# Patient Record
Sex: FEMALE | Race: BLACK OR AFRICAN AMERICAN | URBAN - METROPOLITAN AREA
[De-identification: names, ages, dates, MRNs, and addresses within clinical notes are randomized per-mention and may not be internally consistent; named-entity substitution may affect disease eponyms.]

---

## 2017-04-09 ENCOUNTER — HOSPITAL ENCOUNTER (EMERGENCY)
Age: 37
Discharge: HOME | End: 2017-04-09
Attending: EMERGENCY MEDICINE
Payer: MEDICARE

## 2017-04-09 ENCOUNTER — APPOINTMENT (OUTPATIENT)
Dept: GENERAL RADIOLOGY | Age: 37
End: 2017-04-09
Payer: MEDICARE

## 2017-04-09 VITALS
BODY MASS INDEX: 32.62 KG/M2 | RESPIRATION RATE: 18 BRPM | TEMPERATURE: 98.4 F | HEART RATE: 69 BPM | HEIGHT: 66 IN | WEIGHT: 203 LBS | DIASTOLIC BLOOD PRESSURE: 71 MMHG | SYSTOLIC BLOOD PRESSURE: 113 MMHG | OXYGEN SATURATION: 100 %

## 2017-04-09 DIAGNOSIS — K29.00 ACUTE GASTRITIS WITHOUT HEMORRHAGE, UNSPECIFIED GASTRITIS TYPE: Primary | ICD-10-CM

## 2017-04-09 LAB
POC TROPONIN I: 0 NG/ML (ref 0–0.1)
POC TROPONIN INTERP: NORMAL

## 2017-04-09 PROCEDURE — 84484 ASSAY OF TROPONIN QUANT: CPT

## 2017-04-09 PROCEDURE — 93005 ELECTROCARDIOGRAM TRACING: CPT

## 2017-04-09 PROCEDURE — 99285 EMERGENCY DEPT VISIT HI MDM: CPT

## 2017-04-09 PROCEDURE — 71020 XR CHEST STANDARD TWO VW: CPT

## 2017-04-09 RX ORDER — PANTOPRAZOLE SODIUM 20 MG/1
20 TABLET, DELAYED RELEASE ORAL DAILY
Qty: 30 TABLET | Refills: 0 | Status: SHIPPED | OUTPATIENT
Start: 2017-04-09

## 2017-04-09 ASSESSMENT — ENCOUNTER SYMPTOMS
NAUSEA: 0
ABDOMINAL PAIN: 0
VOMITING: 0
COUGH: 0
CONSTIPATION: 0
SHORTNESS OF BREATH: 0
CHEST TIGHTNESS: 0
DIARRHEA: 0

## 2017-04-09 ASSESSMENT — PAIN DESCRIPTION - ORIENTATION: ORIENTATION: LEFT

## 2017-04-09 ASSESSMENT — PAIN DESCRIPTION - FREQUENCY: FREQUENCY: CONTINUOUS

## 2017-04-09 ASSESSMENT — PAIN SCALES - GENERAL: PAINLEVEL_OUTOF10: 8

## 2017-04-09 ASSESSMENT — PAIN DESCRIPTION - PAIN TYPE: TYPE: ACUTE PAIN

## 2017-04-09 ASSESSMENT — PAIN DESCRIPTION - LOCATION: LOCATION: CHEST

## 2017-04-09 ASSESSMENT — PAIN DESCRIPTION - DESCRIPTORS: DESCRIPTORS: THROBBING

## 2017-04-13 LAB
EKG ATRIAL RATE: 78 BPM
EKG P AXIS: 53 DEGREES
EKG P-R INTERVAL: 154 MS
EKG Q-T INTERVAL: 386 MS
EKG QRS DURATION: 88 MS
EKG QTC CALCULATION (BAZETT): 440 MS
EKG R AXIS: 62 DEGREES
EKG T AXIS: 29 DEGREES
EKG VENTRICULAR RATE: 78 BPM

## 2017-04-20 ENCOUNTER — HOSPITAL ENCOUNTER (OUTPATIENT)
Age: 37
Setting detail: SPECIMEN
Discharge: HOME | End: 2017-04-20
Payer: MEDICARE

## 2017-04-20 LAB
DIRECT EXAM: ABNORMAL
Lab: ABNORMAL
SPECIMEN DESCRIPTION: ABNORMAL
STATUS: ABNORMAL

## 2017-08-07 ENCOUNTER — HOSPITAL ENCOUNTER (OUTPATIENT)
Age: 37
Setting detail: SPECIMEN
Discharge: HOME | End: 2017-08-07
Payer: MEDICARE

## 2017-08-08 LAB
C TRACH DNA GENITAL QL NAA+PROBE: NEGATIVE
DIRECT EXAM: ABNORMAL
Lab: ABNORMAL
N. GONORRHOEAE DNA: NEGATIVE
SPECIMEN DESCRIPTION: ABNORMAL
STATUS: ABNORMAL

## 2017-08-09 LAB
HPV SAMPLE: NORMAL
HPV SOURCE: NORMAL
HPV, GENOTYPE 16: NOT DETECTED
HPV, GENOTYPE 18: NOT DETECTED
HPV, HIGH RISK OTHER: NOT DETECTED
HPV, INTERPRETATION: NORMAL

## 2017-08-10 LAB — CYTOLOGY REPORT: NORMAL

## 2019-03-13 ENCOUNTER — HOSPITAL ENCOUNTER (OUTPATIENT)
Age: 39
Setting detail: SPECIMEN
Discharge: HOME | End: 2019-03-13
Payer: MEDICARE

## 2019-03-13 LAB
DIRECT EXAM: ABNORMAL
Lab: ABNORMAL
SPECIMEN DESCRIPTION: ABNORMAL

## 2019-03-14 LAB
C TRACH DNA GENITAL QL NAA+PROBE: NEGATIVE
N. GONORRHOEAE DNA: NEGATIVE
SPECIMEN DESCRIPTION: NORMAL

## 2020-04-24 ENCOUNTER — APPOINTMENT (OUTPATIENT)
Dept: GENERAL RADIOLOGY | Age: 40
End: 2020-04-24
Payer: MEDICARE

## 2020-04-24 ENCOUNTER — HOSPITAL ENCOUNTER (EMERGENCY)
Age: 40
Discharge: HOME | End: 2020-04-24
Attending: EMERGENCY MEDICINE
Payer: MEDICARE

## 2020-04-24 VITALS
HEART RATE: 79 BPM | OXYGEN SATURATION: 97 % | RESPIRATION RATE: 18 BRPM | SYSTOLIC BLOOD PRESSURE: 119 MMHG | TEMPERATURE: 98.5 F | DIASTOLIC BLOOD PRESSURE: 72 MMHG

## 2020-04-24 LAB
CHP ED QC CHECK: NORMAL
PREGNANCY TEST URINE, POC: NEGATIVE

## 2020-04-24 PROCEDURE — 6370000000 HC RX 637 (ALT 250 FOR IP): Performed by: STUDENT IN AN ORGANIZED HEALTH CARE EDUCATION/TRAINING PROGRAM

## 2020-04-24 PROCEDURE — 71046 X-RAY EXAM CHEST 2 VIEWS: CPT

## 2020-04-24 PROCEDURE — 99284 EMERGENCY DEPT VISIT MOD MDM: CPT

## 2020-04-24 PROCEDURE — 73502 X-RAY EXAM HIP UNI 2-3 VIEWS: CPT

## 2020-04-24 RX ORDER — ACETAMINOPHEN 500 MG
1000 TABLET ORAL ONCE
Status: COMPLETED | OUTPATIENT
Start: 2020-04-24 | End: 2020-04-24

## 2020-04-24 RX ORDER — ACETAMINOPHEN 325 MG/1
650 TABLET ORAL EVERY 6 HOURS PRN
Qty: 30 TABLET | Refills: 0 | Status: SHIPPED | OUTPATIENT
Start: 2020-04-24

## 2020-04-24 RX ORDER — CYCLOBENZAPRINE HCL 5 MG
5 TABLET ORAL 2 TIMES DAILY PRN
Qty: 10 TABLET | Refills: 0 | Status: SHIPPED | OUTPATIENT
Start: 2020-04-24 | End: 2020-05-04

## 2020-04-24 RX ORDER — CYCLOBENZAPRINE HCL 10 MG
5 TABLET ORAL ONCE
Status: COMPLETED | OUTPATIENT
Start: 2020-04-24 | End: 2020-04-24

## 2020-04-24 RX ADMIN — CYCLOBENZAPRINE 5 MG: 10 TABLET, FILM COATED ORAL at 16:18

## 2020-04-24 RX ADMIN — ACETAMINOPHEN 1000 MG: 500 TABLET ORAL at 16:18

## 2020-04-24 ASSESSMENT — ENCOUNTER SYMPTOMS
BACK PAIN: 1
NAUSEA: 0
SHORTNESS OF BREATH: 0
VOMITING: 0
COUGH: 0
ABDOMINAL PAIN: 0
FACIAL SWELLING: 0

## 2020-04-24 ASSESSMENT — PAIN SCALES - GENERAL
PAINLEVEL_OUTOF10: 8
PAINLEVEL_OUTOF10: 8

## 2020-04-24 ASSESSMENT — PAIN DESCRIPTION - LOCATION: LOCATION: BACK;NECK

## 2020-04-24 ASSESSMENT — PAIN DESCRIPTION - DESCRIPTORS: DESCRIPTORS: ACHING;DISCOMFORT
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
## 2022-12-10 ENCOUNTER — HOSPITAL ENCOUNTER (EMERGENCY)
Age: 42
Discharge: HOME | End: 2022-12-10
Attending: EMERGENCY MEDICINE
Payer: MEDICARE

## 2022-12-10 ENCOUNTER — APPOINTMENT (OUTPATIENT)
Dept: GENERAL RADIOLOGY | Age: 42
End: 2022-12-10
Payer: MEDICARE

## 2022-12-10 VITALS
TEMPERATURE: 101.1 F | RESPIRATION RATE: 16 BRPM | HEART RATE: 113 BPM | BODY MASS INDEX: 31.82 KG/M2 | SYSTOLIC BLOOD PRESSURE: 109 MMHG | OXYGEN SATURATION: 99 % | HEIGHT: 66 IN | WEIGHT: 198 LBS | DIASTOLIC BLOOD PRESSURE: 69 MMHG

## 2022-12-10 DIAGNOSIS — B34.9 VIRAL ILLNESS: Primary | ICD-10-CM

## 2022-12-10 LAB
FLU A ANTIGEN: NEGATIVE
FLU B ANTIGEN: NEGATIVE
SARS-COV-2, RAPID: NOT DETECTED
SPECIMEN DESCRIPTION: NORMAL

## 2022-12-10 PROCEDURE — 71045 X-RAY EXAM CHEST 1 VIEW: CPT

## 2022-12-10 PROCEDURE — 6370000000 HC RX 637 (ALT 250 FOR IP): Performed by: EMERGENCY MEDICINE

## 2022-12-10 PROCEDURE — 87635 SARS-COV-2 COVID-19 AMP PRB: CPT

## 2022-12-10 PROCEDURE — 93005 ELECTROCARDIOGRAM TRACING: CPT | Performed by: EMERGENCY MEDICINE

## 2022-12-10 PROCEDURE — 87804 INFLUENZA ASSAY W/OPTIC: CPT

## 2022-12-10 PROCEDURE — 99285 EMERGENCY DEPT VISIT HI MDM: CPT

## 2022-12-10 RX ORDER — ONDANSETRON 4 MG/1
4 TABLET, ORALLY DISINTEGRATING ORAL ONCE
Status: COMPLETED | OUTPATIENT
Start: 2022-12-10 | End: 2022-12-10

## 2022-12-10 RX ORDER — IBUPROFEN 800 MG/1
800 TABLET ORAL ONCE
Status: COMPLETED | OUTPATIENT
Start: 2022-12-10 | End: 2022-12-10

## 2022-12-10 RX ORDER — TRAMADOL HYDROCHLORIDE 50 MG/1
50 TABLET ORAL ONCE
Status: COMPLETED | OUTPATIENT
Start: 2022-12-10 | End: 2022-12-10

## 2022-12-10 RX ADMIN — TRAMADOL HYDROCHLORIDE 50 MG: 50 TABLET, COATED ORAL at 20:09

## 2022-12-10 RX ADMIN — ONDANSETRON 4 MG: 4 TABLET, ORALLY DISINTEGRATING ORAL at 20:09

## 2022-12-10 RX ADMIN — IBUPROFEN 800 MG: 800 TABLET, FILM COATED ORAL at 20:09

## 2022-12-10 ASSESSMENT — ENCOUNTER SYMPTOMS
CHEST TIGHTNESS: 0
SINUS PAIN: 0
DIARRHEA: 0
CONSTIPATION: 0
SHORTNESS OF BREATH: 0
COLOR CHANGE: 0
ABDOMINAL DISTENTION: 0
VOMITING: 0
APNEA: 0
BACK PAIN: 1
EYES NEGATIVE: 1

## 2022-12-10 ASSESSMENT — PAIN DESCRIPTION - DESCRIPTORS: DESCRIPTORS: ACHING

## 2022-12-10 ASSESSMENT — PAIN DESCRIPTION - LOCATION: LOCATION: GENERALIZED

## 2022-12-10 NOTE — Clinical Note
Azalea Guerrier was seen and treated in our emergency department on 12/10/2022. She may return to work on 12/16/2022. If you have any questions or concerns, please don't hesitate to call.       Andres Reddy MD

## 2022-12-11 NOTE — ED PROVIDER NOTES
EMERGENCY DEPARTMENT ENCOUNTER    Pt Name: Jorge Estrada  MRN: [id number]  [place] 1980  Date of evaluation: 12/10/22  CHIEF COMPLAINT       Chief Complaint   Patient presents with    Generalized Body Aches     Onset this morning    Fever     Low grade 100.6 F     HISTORY OF PRESENT ILLNESS   50-year-old female presenting to the emergency room for body aches headache cough and fever. Symptoms started today. Patient works in a nursing home. She is very sore. No injuries. Patient reports dry cough and feeling some shortness of breath. REVIEW OF SYSTEMS     Review of Systems   Constitutional:  Positive for activity change, appetite change, chills, fatigue and fever. Negative for diaphoresis. HENT:  Negative for congestion, sinus pain and tinnitus. Eyes: Negative. Respiratory:  Negative for apnea, chest tightness and shortness of breath. Gastrointestinal:  Negative for abdominal distention, constipation, diarrhea and vomiting. Genitourinary:  Negative for difficulty urinating and frequency. Musculoskeletal:  Positive for arthralgias, back pain and myalgias. Skin:  Negative for color change and rash. Neurological:  Positive for headaches. Negative for dizziness. Hematological: Negative. Psychiatric/Behavioral: Negative. PASTMEDICAL HISTORY   No past medical history on file. Past Problem List  Patient Active Problem List   Diagnosis Code    HRP (high risk pregnancy) O09.90    Pityriasis rosea-like skin eruption R21    Back pain complicating pregnancy C24.520, M54.9     SURGICAL HISTORY     No past surgical history on file.   CURRENT MEDICATIONS       Current Discharge Medication List        CONTINUE these medications which have NOT CHANGED    Details   acetaminophen (TYLENOL) 325 MG tablet Take 2 tablets by mouth every 6 hours as needed for Pain  Qty: 30 tablet, Refills: 0      pantoprazole (PROTONIX) 20 MG tablet Take 1 tablet by mouth daily  Qty: 30 tablet, Refills: 0 ibuprofen (ADVIL;MOTRIN) 800 MG tablet Take 1 tablet by mouth every 8 hours as needed for Pain or Fever  Qty: 15 tablet, Refills: 0      HYDROcodone-acetaminophen (NORCO) 5-325 MG per tablet Take 1 tablet by mouth every 6 hours as needed for Pain (WARNING:  May cause drowsiness. May impair ability to operate vehicles or machinery. Do not use in combination with alcohol.)  Qty: 20 tablet, Refills: 0      hydrOXYzine (ATARAX) 50 MG tablet Take 1 tablet by mouth every 6 hours as needed for Itching. Qty: 30 tablet, Refills: 0      HYDROCORTISONE, TOPICAL, 2 % LOTN Apply 1 Tube topically 2 times daily as needed. Qty: 1 Bottle, Refills: 0           ALLERGIES     is allergic to morphine. FAMILY HISTORY     has no family status information on file. SOCIAL HISTORY       Social History     Tobacco Use    Smoking status: Every Day     Packs/day: 0.25     Types: Cigarettes   Substance Use Topics    Alcohol use: No     Comment: social    Drug use: Yes     Types: Marijuana (Weed)     PHYSICAL EXAM     INITIAL VITALS: /69   Pulse (!) 113   Temp (!) 101.1 °F (38.4 °C) (Oral)   Resp 16   Ht 5' 6\" (1.676 m)   Wt 198 lb (89.8 kg)   LMP 12/01/2022   SpO2 99%   BMI 31.96 kg/m²    Physical Exam  Constitutional:       General: She is not in acute distress. Appearance: She is well-developed. She is ill-appearing. HENT:      Head: Normocephalic. Eyes:      Pupils: Pupils are equal, round, and reactive to light. Cardiovascular:      Rate and Rhythm: Normal rate and regular rhythm. Heart sounds: Normal heart sounds. Pulmonary:      Effort: Pulmonary effort is normal. No respiratory distress. Breath sounds: Normal breath sounds. Abdominal:      General: Bowel sounds are normal.      Palpations: Abdomen is soft. Tenderness: There is no abdominal tenderness. Musculoskeletal:         General: Normal range of motion. Skin:     General: Skin is warm and dry.    Neurological:      Mental Status: She is alert and oriented to person, place, and time. MEDICAL DECISION MAKIN-year-old female presenting to the emergency room for cough myalgias headache fever. Patient has clear lung sounds on exam.  SPO2 is 99% and respirations are normal.  Patient is mildly tachycardic however has fever. Symptoms are considered viral at this time. Chest x-ray is negative for acute intrathoracic process. Rapid COVID and flu were negative here in the ED. Based on the patient's clinical exam I feel she is stable for discharge. Patient given home care instructions and return precautions. CRITICAL CARE:       PROCEDURES:    Procedures    DIAGNOSTIC RESULTS   EKG:All EKG's are interpreted by the Emergency Department Physician who either signs or Co-signs this chart in the absence of a cardiologist.        RADIOLOGY:All plain film, CT, MRI, and formal ultrasound images (except ED bedside ultrasound) are read by the radiologist, see reports below, unless otherwisenoted in MDM or here. XR CHEST PORTABLE   Final Result   Mild perihilar congestion. Suspect mild bibasilar edema or bibasilar atelectasis           LABS: All lab results were reviewed by myself, and all abnormals are listed below. Labs Reviewed   RAPID INFLUENZA A/B ANTIGENS   COVID-19, RAPID       EMERGENCY DEPARTMENTCOURSE:         Vitals:    Vitals:    12/10/22 1805 12/10/22 2051   BP: 109/69    Pulse: (!) 113    Resp: 16    Temp: (!) 100.6 °F (38.1 °C) (!) 101.1 °F (38.4 °C)   TempSrc: Oral Oral   SpO2: 99%    Weight: 198 lb (89.8 kg)    Height: 5' 6\" (1.676 m)        The patient was given the following medications while in the emergency department:  Orders Placed This Encounter   Medications    ibuprofen (ADVIL;MOTRIN) tablet 800 mg    traMADol (ULTRAM) tablet 50 mg    ondansetron (ZOFRAN-ODT) disintegrating tablet 4 mg     CONSULTS:  None    FINAL IMPRESSION      1.  Viral illness          DISPOSITION/PLAN   DISPOSITION Decision To Discharge 12/10/2022 08:57:25 PM      PATIENT REFERRED TO:  No follow-up provider specified. DISCHARGE MEDICATIONS:  Current Discharge Medication List        Henrietta Patino MD  Attending Emergency Physician      Care during this encounter was due to an unprecedented national emergency due to COVID-19.              Nasreen Snow MD  12/10/22 3898

## 2022-12-11 NOTE — ED NOTES
This RN spoke with ER MD about increase in Temp.  No orders given at this time     Vianca Ortega RN  12/10/22 2054

## 2022-12-11 NOTE — DISCHARGE INSTRUCTIONS
Try to make sure you are getting plenty of fluids at home. I recommend Tylenol and Motrin to help with body aches headaches and fever. You can alternate these medications every 4 hours. It is common to be ill for at least 5 days with viral illnesses. Body aches sore throat cough headache fatigue nausea vomiting are all common symptoms with viral illnesses. If symptoms seem severe please return the emergency room at any time. As we discussed rapid tests do have a high false-negative rate. Your COVID and influenza test here today were negative.

## 2022-12-12 LAB
EKG ATRIAL RATE: 98 BPM
EKG P AXIS: 73 DEGREES
EKG P-R INTERVAL: 148 MS
EKG Q-T INTERVAL: 346 MS
EKG QRS DURATION: 84 MS
EKG QTC CALCULATION (BAZETT): 441 MS
EKG R AXIS: 74 DEGREES
EKG T AXIS: 51 DEGREES
EKG VENTRICULAR RATE: 98 BPM

## 2023-12-28 ENCOUNTER — HOSPITAL ENCOUNTER (EMERGENCY)
Age: 43
Discharge: HOME | End: 2023-12-28
Attending: EMERGENCY MEDICINE

## 2023-12-28 ENCOUNTER — APPOINTMENT (OUTPATIENT)
Dept: GENERAL RADIOLOGY | Age: 43
End: 2023-12-28

## 2023-12-28 VITALS
RESPIRATION RATE: 18 BRPM | TEMPERATURE: 98.7 F | BODY MASS INDEX: 31.82 KG/M2 | DIASTOLIC BLOOD PRESSURE: 63 MMHG | SYSTOLIC BLOOD PRESSURE: 102 MMHG | HEIGHT: 66 IN | OXYGEN SATURATION: 98 % | HEART RATE: 98 BPM | WEIGHT: 198 LBS

## 2023-12-28 DIAGNOSIS — R07.9 CHEST PAIN, UNSPECIFIED TYPE: Primary | ICD-10-CM

## 2023-12-28 LAB
ALBUMIN SERPL-MCNC: 3.9 G/DL (ref 3.5–5.2)
ALP SERPL-CCNC: 48 U/L (ref 35–104)
ALT SERPL-CCNC: 10 U/L (ref 5–33)
ANION GAP SERPL CALCULATED.3IONS-SCNC: 11 MMOL/L (ref 9–17)
AST SERPL-CCNC: 22 U/L
BASOPHILS # BLD: 0.04 K/UL (ref 0–0.2)
BASOPHILS NFR BLD: 1 % (ref 0–2)
BILIRUB SERPL-MCNC: 0.3 MG/DL (ref 0.3–1.2)
BUN SERPL-MCNC: 15 MG/DL (ref 6–20)
BUN/CREAT SERPL: 15 (ref 9–20)
CALCIUM SERPL-MCNC: 9.6 MG/DL (ref 8.6–10.4)
CHLORIDE SERPL-SCNC: 105 MMOL/L (ref 98–107)
CO2 SERPL-SCNC: 17 MMOL/L (ref 20–31)
CREAT SERPL-MCNC: 1 MG/DL (ref 0.5–0.9)
D DIMER PPP FEU-MCNC: 0.4 UG/ML FEU (ref 0–0.59)
EOSINOPHIL # BLD: 0.15 K/UL (ref 0–0.44)
EOSINOPHILS RELATIVE PERCENT: 2 % (ref 1–4)
ERYTHROCYTE [DISTWIDTH] IN BLOOD BY AUTOMATED COUNT: 14.4 % (ref 11.8–14.4)
GFR SERPL CREATININE-BSD FRML MDRD: >60 ML/MIN/1.73M2
GLUCOSE SERPL-MCNC: 79 MG/DL (ref 70–99)
HCT VFR BLD AUTO: 44.2 % (ref 36.3–47.1)
HGB BLD-MCNC: 13.8 G/DL (ref 11.9–15.1)
IMM GRANULOCYTES # BLD AUTO: 0.01 K/UL (ref 0–0.3)
IMM GRANULOCYTES NFR BLD: 0 %
LYMPHOCYTES NFR BLD: 3.54 K/UL (ref 1.1–3.7)
LYMPHOCYTES RELATIVE PERCENT: 53 % (ref 24–43)
MCH RBC QN AUTO: 28.3 PG (ref 25.2–33.5)
MCHC RBC AUTO-ENTMCNC: 31.2 G/DL (ref 28.4–34.8)
MCV RBC AUTO: 90.8 FL (ref 82.6–102.9)
MONOCYTES NFR BLD: 0.5 K/UL (ref 0.1–1.2)
MONOCYTES NFR BLD: 8 % (ref 3–12)
NEUTROPHILS NFR BLD: 36 % (ref 36–65)
NEUTS SEG NFR BLD: 2.38 K/UL (ref 1.5–8.1)
NRBC BLD-RTO: 0 PER 100 WBC
PLATELET # BLD AUTO: 215 K/UL (ref 138–453)
PMV BLD AUTO: 10.2 FL (ref 8.1–13.5)
POTASSIUM SERPL-SCNC: 4.8 MMOL/L (ref 3.7–5.3)
PROT SERPL-MCNC: 7.5 G/DL (ref 6.4–8.3)
RBC # BLD AUTO: 4.87 M/UL (ref 3.95–5.11)
SODIUM SERPL-SCNC: 133 MMOL/L (ref 135–144)
TROPONIN I SERPL HS-MCNC: <6 NG/L (ref 0–14)
TROPONIN I SERPL HS-MCNC: <6 NG/L (ref 0–14)
WBC OTHER # BLD: 6.6 K/UL (ref 3.5–11.3)

## 2023-12-28 PROCEDURE — 84484 ASSAY OF TROPONIN QUANT: CPT

## 2023-12-28 PROCEDURE — 80053 COMPREHEN METABOLIC PANEL: CPT

## 2023-12-28 PROCEDURE — 71046 X-RAY EXAM CHEST 2 VIEWS: CPT

## 2023-12-28 PROCEDURE — 99285 EMERGENCY DEPT VISIT HI MDM: CPT

## 2023-12-28 PROCEDURE — 93005 ELECTROCARDIOGRAM TRACING: CPT | Performed by: EMERGENCY MEDICINE

## 2023-12-28 PROCEDURE — 85025 COMPLETE CBC W/AUTO DIFF WBC: CPT

## 2023-12-28 PROCEDURE — 6370000000 HC RX 637 (ALT 250 FOR IP): Performed by: EMERGENCY MEDICINE

## 2023-12-28 PROCEDURE — 85379 FIBRIN DEGRADATION QUANT: CPT

## 2023-12-28 RX ORDER — ACETAMINOPHEN 500 MG
1000 TABLET ORAL ONCE
Status: COMPLETED | OUTPATIENT
Start: 2023-12-28 | End: 2023-12-28

## 2023-12-28 RX ADMIN — ACETAMINOPHEN 1000 MG: 500 TABLET ORAL at 14:30

## 2023-12-28 ASSESSMENT — PAIN - FUNCTIONAL ASSESSMENT: PAIN_FUNCTIONAL_ASSESSMENT: 0-10

## 2023-12-28 ASSESSMENT — PAIN SCALES - GENERAL: PAINLEVEL_OUTOF10: 3

## 2023-12-29 LAB
EKG ATRIAL RATE: 66 BPM
EKG P AXIS: 66 DEGREES
EKG P-R INTERVAL: 152 MS
EKG Q-T INTERVAL: 398 MS
EKG QRS DURATION: 82 MS
EKG QTC CALCULATION (BAZETT): 417 MS
EKG R AXIS: 64 DEGREES
EKG T AXIS: 50 DEGREES
EKG VENTRICULAR RATE: 66 BPM

## 2024-11-19 ENCOUNTER — HOSPITAL ENCOUNTER (EMERGENCY)
Age: 44
Discharge: HOME | End: 2024-11-19
Attending: EMERGENCY MEDICINE
Payer: MEDICAID

## 2024-11-19 VITALS
WEIGHT: 190 LBS | SYSTOLIC BLOOD PRESSURE: 105 MMHG | TEMPERATURE: 98.4 F | BODY MASS INDEX: 30.53 KG/M2 | HEART RATE: 78 BPM | HEIGHT: 66 IN | RESPIRATION RATE: 16 BRPM | DIASTOLIC BLOOD PRESSURE: 70 MMHG | OXYGEN SATURATION: 100 %

## 2024-11-19 DIAGNOSIS — J02.9 ACUTE PHARYNGITIS, UNSPECIFIED ETIOLOGY: Primary | ICD-10-CM

## 2024-11-19 LAB
SPECIMEN SOURCE: NORMAL
STREP A, MOLECULAR: NEGATIVE

## 2024-11-19 PROCEDURE — 96372 THER/PROPH/DIAG INJ SC/IM: CPT

## 2024-11-19 PROCEDURE — 87651 STREP A DNA AMP PROBE: CPT

## 2024-11-19 PROCEDURE — 99284 EMERGENCY DEPT VISIT MOD MDM: CPT

## 2024-11-19 PROCEDURE — 6360000002 HC RX W HCPCS: Performed by: EMERGENCY MEDICINE

## 2024-11-19 PROCEDURE — 6370000000 HC RX 637 (ALT 250 FOR IP): Performed by: EMERGENCY MEDICINE

## 2024-11-19 RX ORDER — PENICILLIN V POTASSIUM 500 MG/1
500 TABLET, FILM COATED ORAL 4 TIMES DAILY
Qty: 40 TABLET | Refills: 0 | Status: SHIPPED | OUTPATIENT
Start: 2024-11-19 | End: 2024-11-29

## 2024-11-19 RX ORDER — PENICILLIN V POTASSIUM 250 MG/1
500 TABLET, FILM COATED ORAL ONCE
Status: COMPLETED | OUTPATIENT
Start: 2024-11-19 | End: 2024-11-19

## 2024-11-19 RX ORDER — DEXAMETHASONE SODIUM PHOSPHATE 10 MG/ML
8 INJECTION, SOLUTION INTRAMUSCULAR; INTRAVENOUS ONCE
Status: COMPLETED | OUTPATIENT
Start: 2024-11-19 | End: 2024-11-19

## 2024-11-19 RX ADMIN — PENICILLIN V POTASSIUM 500 MG: 250 TABLET, FILM COATED ORAL at 04:51

## 2024-11-19 RX ADMIN — DEXAMETHASONE SODIUM PHOSPHATE 8 MG: 10 INJECTION, SOLUTION INTRAMUSCULAR; INTRAVENOUS at 04:50

## 2024-11-19 ASSESSMENT — PAIN SCALES - GENERAL: PAINLEVEL_OUTOF10: 9

## 2024-11-19 ASSESSMENT — PAIN - FUNCTIONAL ASSESSMENT: PAIN_FUNCTIONAL_ASSESSMENT: 0-10

## 2024-11-19 NOTE — ED PROVIDER NOTES
EMERGENCY DEPARTMENT ENCOUNTER    Pt Name: Samreen Mena  MRN: [id number]  Birthdate 1980  Date of evaluation: 11/19/24  CHIEF COMPLAINT       Chief Complaint   Patient presents with    Pharyngitis     Began yesterday morning     HISTORY OF PRESENT ILLNESS   The history is provided by the patient and medical records.  The patient is a 44-year-old female who presents to the ED for sore throat.  Symptoms started yesterday.  No fevers.  No runny nose, nasal congestion.  No cough.      REVIEW OF SYSTEMS     Review of Systems  All other systems reviewed and are negative.    PASTMEDICAL HISTORY   No past medical history on file.  Past Problem List  Patient Active Problem List   Diagnosis Code    HRP (high risk pregnancy) O09.90    Pityriasis rosea-like skin eruption R21    Back pain complicating pregnancy O99.891, M54.9     SURGICAL HISTORY     No past surgical history on file.  CURRENT MEDICATIONS       Previous Medications    ACETAMINOPHEN (TYLENOL) 325 MG TABLET    Take 2 tablets by mouth every 6 hours as needed for Pain    HYDROCODONE-ACETAMINOPHEN (NORCO) 5-325 MG PER TABLET    Take 1 tablet by mouth every 6 hours as needed for Pain (WARNING:  May cause drowsiness.  May impair ability to operate vehicles or machinery.  Do not use in combination with alcohol.)    HYDROCORTISONE, TOPICAL, 2 % LOTN    Apply 1 Tube topically 2 times daily as needed.    HYDROXYZINE (ATARAX) 50 MG TABLET    Take 1 tablet by mouth every 6 hours as needed for Itching.    IBUPROFEN (ADVIL;MOTRIN) 800 MG TABLET    Take 1 tablet by mouth every 8 hours as needed for Pain or Fever    PANTOPRAZOLE (PROTONIX) 20 MG TABLET    Take 1 tablet by mouth daily     ALLERGIES     is allergic to morphine.  FAMILY HISTORY     has no family status information on file.      SOCIAL HISTORY       Social History     Tobacco Use    Smoking status: Every Day     Current packs/day: 0.25     Types: Cigarettes   Substance Use Topics    Alcohol use: No     Comment:

## 2024-11-19 NOTE — DISCHARGE INSTRUCTIONS
Take your antibiotics as prescribed.    Follow this medication plan for 3 days to control your pain: (Warning - be mindful of over-the-counter medications that contain Tylenol. Maximum dose of acetaminophen is 4000 mg from all sources in 24 hours).    9am Tylenol 1000 mg  12noon Ibuprofen 800 mg  3pm Tylenol 1000 mg  6pm Ibuprofen 800 mg    Return to this emergency room immediately if your symptoms persist, worsen or if new ones form.    Make sure you follow-up with your primary care doctor within the next 1-2 business days.